# Patient Record
Sex: MALE | Race: WHITE | HISPANIC OR LATINO | URBAN - METROPOLITAN AREA
[De-identification: names, ages, dates, MRNs, and addresses within clinical notes are randomized per-mention and may not be internally consistent; named-entity substitution may affect disease eponyms.]

---

## 2017-08-04 ENCOUNTER — HOSPITAL ENCOUNTER (EMERGENCY)
Facility: HOSPITAL | Age: 32
Discharge: ANOTHER HOSPITAL | End: 2017-08-04

## 2017-08-04 VITALS
OXYGEN SATURATION: 97 % | SYSTOLIC BLOOD PRESSURE: 95 MMHG | DIASTOLIC BLOOD PRESSURE: 53 MMHG | TEMPERATURE: 95 F | RESPIRATION RATE: 22 BRPM | HEART RATE: 96 BPM

## 2017-08-04 DIAGNOSIS — I95.9 HYPOTENSION, UNSPECIFIED HYPOTENSION TYPE: ICD-10-CM

## 2017-08-04 DIAGNOSIS — J93.9 PNEUMOTHORAX ON RIGHT: ICD-10-CM

## 2017-08-04 DIAGNOSIS — T14.90XA TRAUMA: ICD-10-CM

## 2017-08-04 DIAGNOSIS — S02.91XB: Primary | ICD-10-CM

## 2017-08-04 DIAGNOSIS — D64.9 ANEMIA, UNSPECIFIED TYPE: ICD-10-CM

## 2017-08-04 DIAGNOSIS — J96.90: ICD-10-CM

## 2017-08-04 DIAGNOSIS — S01.01XA SCALP LACERATION, INITIAL ENCOUNTER: ICD-10-CM

## 2017-08-04 LAB
ALLENS TEST: ABNORMAL
BASOPHILS NFR BLD: 0 %
BLD PROD TYP BPU: NORMAL
BLD PROD TYP BPU: NORMAL
BLOOD UNIT EXPIRATION DATE: NORMAL
BLOOD UNIT EXPIRATION DATE: NORMAL
BLOOD UNIT TYPE CODE: 9500
BLOOD UNIT TYPE CODE: 9500
BLOOD UNIT TYPE: NORMAL
BLOOD UNIT TYPE: NORMAL
BUN BLD-MCNC: 27 MG/DL
CA-I BLDV-SCNC: 0.98 MMOL/L
CHLORIDE BLD-SCNC: 108 MMOL/L
CO2 BLD-SCNC: 18 MMOL/L
CODING SYSTEM: NORMAL
CODING SYSTEM: NORMAL
CREATININE: 1.1 MG/DL
DELSYS: ABNORMAL
DIFFERENTIAL METHOD: ABNORMAL
DISPENSE STATUS: NORMAL
DISPENSE STATUS: NORMAL
EOSINOPHIL NFR BLD: 1 %
ERYTHROCYTE [DISTWIDTH] IN BLOOD BY AUTOMATED COUNT: 12.5 %
ERYTHROCYTE [SEDIMENTATION RATE] IN BLOOD BY WESTERGREN METHOD: 20 MM/H
FIO2: 100
GLUCOSE BLD-MCNC: 242 MG/DL
HCO3 UR-SCNC: 14.7 MMOL/L (ref 24–28)
HCT VFR BLD AUTO: 33.8 %
HCT VFR BLD AUTO: 33.8 %
HGB BLD-MCNC: 11.3 G/DL
LYMPHOCYTES NFR BLD: 34 %
MCH RBC QN AUTO: 31.7 PG
MCHC RBC AUTO-ENTMCNC: 33.4 G/DL
MCV RBC AUTO: 95 FL
MODE: ABNORMAL
MONOCYTES NFR BLD: 7 %
NEUTROPHILS NFR BLD: 57 %
NEUTS BAND NFR BLD MANUAL: 1 %
NUM UNITS TRANS PACKED RBC: NORMAL
NUM UNITS TRANS PACKED RBC: NORMAL
PCO2 BLDA: 50.9 MMHG (ref 35–45)
PH SMN: 7.07 [PH] (ref 7.35–7.45)
PLATELET # BLD AUTO: 145 K/UL
PMV BLD AUTO: 10.3 FL
PO2 BLDA: 41 MMHG (ref 80–100)
POC BE: -15 MMOL/L
POC SATURATED O2: 56 % (ref 95–100)
POC TCO2: 16 MMOL/L (ref 23–27)
POTASSIUM BLD-SCNC: 3.3 MMOL/L
RBC # BLD AUTO: 3.57 M/UL
SAMPLE: ABNORMAL
SITE: ABNORMAL
SODIUM BLD-SCNC: 141 MMOL/L
SP02: 100
VT: 400
WBC # BLD AUTO: 23.01 K/UL

## 2017-08-04 PROCEDURE — 85027 COMPLETE CBC AUTOMATED: CPT

## 2017-08-04 PROCEDURE — 36600 WITHDRAWAL OF ARTERIAL BLOOD: CPT

## 2017-08-04 PROCEDURE — 80047 BASIC METABLC PNL IONIZED CA: CPT

## 2017-08-04 PROCEDURE — 99291 CRITICAL CARE FIRST HOUR: CPT

## 2017-08-04 PROCEDURE — 32551 INSERTION OF CHEST TUBE: CPT

## 2017-08-04 PROCEDURE — 82803 BLOOD GASES ANY COMBINATION: CPT

## 2017-08-04 PROCEDURE — 25000003 PHARM REV CODE 250

## 2017-08-04 PROCEDURE — P9016 RBC LEUKOCYTES REDUCED: HCPCS

## 2017-08-04 PROCEDURE — 36430 TRANSFUSION BLD/BLD COMPNT: CPT

## 2017-08-04 PROCEDURE — 63600175 PHARM REV CODE 636 W HCPCS

## 2017-08-04 PROCEDURE — 85007 BL SMEAR W/DIFF WBC COUNT: CPT

## 2017-08-04 PROCEDURE — 86920 COMPATIBILITY TEST SPIN: CPT

## 2017-08-04 PROCEDURE — 63600175 PHARM REV CODE 636 W HCPCS: Performed by: EMERGENCY MEDICINE

## 2017-08-04 PROCEDURE — 31500 INSERT EMERGENCY AIRWAY: CPT

## 2017-08-04 PROCEDURE — 25000003 PHARM REV CODE 250: Performed by: EMERGENCY MEDICINE

## 2017-08-04 PROCEDURE — 27000221 HC OXYGEN, UP TO 24 HOURS

## 2017-08-04 RX ORDER — PROPOFOL 10 MG/ML
INJECTION, EMULSION INTRAVENOUS
Status: DISCONTINUED
Start: 2017-08-04 | End: 2017-08-04 | Stop reason: HOSPADM

## 2017-08-04 RX ORDER — ETOMIDATE 2 MG/ML
INJECTION INTRAVENOUS CODE/TRAUMA/SEDATION MEDICATION
Status: DISCONTINUED | OUTPATIENT
Start: 2017-08-04 | End: 2017-08-04 | Stop reason: HOSPADM

## 2017-08-04 RX ORDER — SUCCINYLCHOLINE CHLORIDE 20 MG/ML
INJECTION INTRAMUSCULAR; INTRAVENOUS CODE/TRAUMA/SEDATION MEDICATION
Status: DISCONTINUED | OUTPATIENT
Start: 2017-08-04 | End: 2017-08-04 | Stop reason: HOSPADM

## 2017-08-04 RX ORDER — LORAZEPAM 2 MG/ML
INJECTION INTRAMUSCULAR
Status: DISCONTINUED
Start: 2017-08-04 | End: 2017-08-04 | Stop reason: WASHOUT

## 2017-08-04 RX ORDER — CEFAZOLIN SODIUM 1 G/3ML
INJECTION, POWDER, FOR SOLUTION INTRAMUSCULAR; INTRAVENOUS
Status: COMPLETED
Start: 2017-08-04 | End: 2017-08-04

## 2017-08-04 RX ORDER — LIDOCAINE HYDROCHLORIDE 10 MG/ML
INJECTION INFILTRATION; PERINEURAL
Status: COMPLETED
Start: 2017-08-04 | End: 2017-08-04

## 2017-08-04 RX ORDER — MORPHINE SULFATE 2 MG/ML
INJECTION, SOLUTION INTRAMUSCULAR; INTRAVENOUS
Status: DISCONTINUED
Start: 2017-08-04 | End: 2017-08-04 | Stop reason: WASHOUT

## 2017-08-04 RX ADMIN — ETOMIDATE 20 MG: 2 INJECTION, SOLUTION INTRAVENOUS at 06:08

## 2017-08-04 RX ADMIN — SUCCINYLCHOLINE CHLORIDE 50 MG: 20 INJECTION, SOLUTION INTRAMUSCULAR; INTRAVENOUS at 06:08

## 2017-08-04 RX ADMIN — CEFAZOLIN 1000 MG: 330 INJECTION, POWDER, FOR SOLUTION INTRAMUSCULAR; INTRAVENOUS at 06:08

## 2017-08-04 RX ADMIN — LIDOCAINE HYDROCHLORIDE 100 MG: 10 INJECTION, SOLUTION INFILTRATION; PERINEURAL at 07:08

## 2017-08-04 NOTE — ED NOTES
Patient received on  100% ambu sat's 85%.  Patient intubated with 7.5 ET-tube @[id number] sat's 100%. After x-ray and ABG patient ET-Tube pulled back to [id number] tapped and secured sat's 100% and stable.
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
## 2017-08-04 NOTE — ED NOTES
Results for JANAK SANCHEZ (MRN [id number]) as of 8/4/2017 07:22   Ref. Range 8/4/2017 06:59   POC PH Latest Ref Range: 7.35 - 7.45  7.069 (LL)   POC PCO2 Latest Ref Range: 35 - 45 mmHg 50.9 (H)   POC PO2 Latest Ref Range: 80 - 100 mmHg 41 (LL)   POC BE Latest Ref Range: -2 to 2 mmol/L -15   POC HCO3 Latest Ref Range: 24 - 28 mmol/L 14.7 (L)   POC SATURATED O2 Latest Ref Range: 95 - 100 % 56 (L)   POC TCO2 Latest Ref Range: 23 - 27 mmol/L 16 (L)   FiO2 Unknown 100   Vt Unknown 400   Sample Unknown ARTERIAL   DelSys Unknown Adult Vent   Allens Test Unknown Pass   Site Unknown LR   Mode Unknown AC/PRVC   Rate Unknown 20   Sp02 Unknown 100   ABG retrieved ,resulted and given to physician.   ET-tube pulled back to [id number] per Dr. Orders after ABG resulted.